# Patient Record
Sex: MALE | Race: WHITE | NOT HISPANIC OR LATINO | Employment: FULL TIME | URBAN - METROPOLITAN AREA
[De-identification: names, ages, dates, MRNs, and addresses within clinical notes are randomized per-mention and may not be internally consistent; named-entity substitution may affect disease eponyms.]

---

## 2017-03-27 ENCOUNTER — OCC HEALTH (OUTPATIENT)
Dept: OCCUPATIONAL MEDICINE | Age: 22
End: 2017-03-27

## 2017-03-27 VITALS
WEIGHT: 192 LBS | HEIGHT: 69 IN | SYSTOLIC BLOOD PRESSURE: 122 MMHG | HEART RATE: 76 BPM | BODY MASS INDEX: 28.44 KG/M2 | DIASTOLIC BLOOD PRESSURE: 64 MMHG

## 2017-03-27 DIAGNOSIS — Z02.89 VISIT FOR OCCUPATIONAL HEALTH EXAMINATION: ICD-10-CM

## 2017-03-27 DIAGNOSIS — Z11.1 SCREENING FOR TUBERCULOSIS: ICD-10-CM

## 2017-03-27 DIAGNOSIS — Z02.89 HEALTH EXAMINATION OF DEFINED SUBPOPULATION: Primary | ICD-10-CM

## 2017-03-27 DIAGNOSIS — Z02.89 ENCOUNTER FOR OCCUPATIONAL HEALTH EXAMINATION: ICD-10-CM

## 2017-03-27 PROCEDURE — 86580 TB INTRADERMAL TEST: CPT | Performed by: PHYSICIAN ASSISTANT

## 2017-03-27 PROCEDURE — OH021 PRE PLACEMENT PHYSICAL EXAM: Performed by: PHYSICIAN ASSISTANT

## 2017-03-27 PROCEDURE — OH035 DOT/N-DOT DS COLLECTION ONLY (ALL TYPES): Performed by: PHYSICIAN ASSISTANT

## 2017-03-29 ENCOUNTER — OFFICE VISIT (OUTPATIENT)
Dept: OCCUPATIONAL MEDICINE | Age: 22
End: 2017-03-29

## 2017-03-29 DIAGNOSIS — Z02.89 HEALTH EXAMINATION OF DEFINED SUBPOPULATION: Primary | ICD-10-CM

## 2017-03-29 LAB — INDURATION: NEGATIVE MM (ref 0–?)

## 2017-06-26 ENCOUNTER — WALK IN (OUTPATIENT)
Dept: URGENT CARE | Age: 22
End: 2017-06-26

## 2017-06-26 VITALS
BODY MASS INDEX: 28.61 KG/M2 | OXYGEN SATURATION: 95 % | DIASTOLIC BLOOD PRESSURE: 75 MMHG | HEART RATE: 73 BPM | SYSTOLIC BLOOD PRESSURE: 114 MMHG | RESPIRATION RATE: 16 BRPM | TEMPERATURE: 98.5 F | HEIGHT: 69 IN | WEIGHT: 193.2 LBS

## 2017-06-26 DIAGNOSIS — R11.0 NAUSEA IN ADULT: Primary | ICD-10-CM

## 2017-06-26 PROCEDURE — 99214 OFFICE O/P EST MOD 30 MIN: CPT | Performed by: INTERNAL MEDICINE

## 2017-06-26 RX ORDER — ONDANSETRON 8 MG/1
TABLET, ORALLY DISINTEGRATING ORAL
Qty: 20 TABLET | Refills: 0 | Status: SHIPPED | OUTPATIENT
Start: 2017-06-26 | End: 2017-07-06

## 2017-06-26 RX ORDER — QUETIAPINE FUMARATE 25 MG/1
25 TABLET, FILM COATED ORAL
COMMUNITY
Start: 2017-06-01 | End: 2019-12-05 | Stop reason: ALTCHOICE

## 2019-12-05 ENCOUNTER — WALK IN (OUTPATIENT)
Dept: URGENT CARE | Age: 24
End: 2019-12-05

## 2019-12-05 ENCOUNTER — IMAGING SERVICES (OUTPATIENT)
Dept: GENERAL RADIOLOGY | Age: 24
End: 2019-12-05
Attending: NURSE PRACTITIONER

## 2019-12-05 DIAGNOSIS — R05.9 COUGH: ICD-10-CM

## 2019-12-05 DIAGNOSIS — R05.9 COUGH: Primary | ICD-10-CM

## 2019-12-05 PROCEDURE — 99213 OFFICE O/P EST LOW 20 MIN: CPT | Performed by: NURSE PRACTITIONER

## 2019-12-05 PROCEDURE — 71046 X-RAY EXAM CHEST 2 VIEWS: CPT | Performed by: RADIOLOGY

## 2019-12-05 RX ORDER — ALBUTEROL SULFATE 90 UG/1
2 AEROSOL, METERED RESPIRATORY (INHALATION) EVERY 4 HOURS PRN
Qty: 1 INHALER | Refills: 0 | Status: SHIPPED | OUTPATIENT
Start: 2019-12-05 | End: 2020-06-23 | Stop reason: ALTCHOICE

## 2019-12-05 RX ORDER — PREDNISONE 20 MG/1
40 TABLET ORAL DAILY
Qty: 10 TABLET | Refills: 0 | Status: SHIPPED | OUTPATIENT
Start: 2019-12-05 | End: 2019-12-26 | Stop reason: ALTCHOICE

## 2019-12-05 ASSESSMENT — ENCOUNTER SYMPTOMS
SINUS PAIN: 0
FATIGUE: 0
WHEEZING: 0
SHORTNESS OF BREATH: 1
NAUSEA: 0
VOMITING: 0
SINUS PRESSURE: 0
COUGH: 1
FEVER: 0
DIARRHEA: 0
RHINORRHEA: 0
SORE THROAT: 0
CHILLS: 0

## 2019-12-05 ASSESSMENT — PAIN SCALES - GENERAL: PAINLEVEL: 0

## 2019-12-18 DIAGNOSIS — R05.9 COUGH: ICD-10-CM

## 2019-12-26 ENCOUNTER — HOSPITAL ENCOUNTER (EMERGENCY)
Age: 24
Discharge: HOME | End: 2019-12-27
Attending: EMERGENCY MEDICINE

## 2019-12-26 ENCOUNTER — WALK IN (OUTPATIENT)
Dept: URGENT CARE | Age: 24
End: 2019-12-26

## 2019-12-26 VITALS
RESPIRATION RATE: 14 BRPM | HEIGHT: 70 IN | BODY MASS INDEX: 25.56 KG/M2 | DIASTOLIC BLOOD PRESSURE: 84 MMHG | OXYGEN SATURATION: 99 % | SYSTOLIC BLOOD PRESSURE: 120 MMHG | HEART RATE: 92 BPM | WEIGHT: 178.57 LBS | TEMPERATURE: 99.7 F

## 2019-12-26 DIAGNOSIS — B34.9 VIRAL SYNDROME: Primary | ICD-10-CM

## 2019-12-26 DIAGNOSIS — R53.83 MALAISE AND FATIGUE: Primary | ICD-10-CM

## 2019-12-26 DIAGNOSIS — R42 LIGHTHEADEDNESS: ICD-10-CM

## 2019-12-26 DIAGNOSIS — R53.81 MALAISE AND FATIGUE: Primary | ICD-10-CM

## 2019-12-26 LAB
ALBUMIN SERPL-MCNC: 4.4 G/DL (ref 3.6–5.1)
ALBUMIN/GLOB SERPL: 1.3 {RATIO} (ref 1–2.4)
ALP SERPL-CCNC: 76 UNITS/L (ref 45–117)
ALT SERPL-CCNC: 43 UNITS/L
ANION GAP SERPL CALC-SCNC: 12 MMOL/L (ref 10–20)
AST SERPL-CCNC: 24 UNITS/L
BASOPHILS # BLD AUTO: 0.1 K/MCL (ref 0–0.3)
BASOPHILS NFR BLD AUTO: 1 %
BILIRUB SERPL-MCNC: 0.6 MG/DL (ref 0.2–1)
BUN SERPL-MCNC: 15 MG/DL (ref 6–20)
BUN/CREAT SERPL: 21 (ref 7–25)
CALCIUM SERPL-MCNC: 9.7 MG/DL (ref 8.4–10.2)
CHLORIDE SERPL-SCNC: 103 MMOL/L (ref 98–107)
CO2 SERPL-SCNC: 30 MMOL/L (ref 21–32)
CREAT SERPL-MCNC: 0.7 MG/DL (ref 0.67–1.17)
DIFFERENTIAL METHOD BLD: NORMAL
EOSINOPHIL # BLD AUTO: 0.1 K/MCL (ref 0.1–0.5)
EOSINOPHIL NFR SPEC: 1 %
ERYTHROCYTE [DISTWIDTH] IN BLOOD: 12.4 % (ref 11–15)
GLOBULIN SER-MCNC: 3.5 G/DL (ref 2–4)
GLUCOSE SERPL-MCNC: 108 MG/DL (ref 65–99)
HCT VFR BLD CALC: 47.4 % (ref 39–51)
HGB BLD-MCNC: 16.2 G/DL (ref 13–17)
IMM GRANULOCYTES # BLD AUTO: 0 K/MCL (ref 0–0.2)
IMM GRANULOCYTES NFR BLD: 0 %
LYMPHOCYTES # BLD MANUAL: 3 K/MCL (ref 1–4.8)
LYMPHOCYTES NFR BLD MANUAL: 39 %
MCH RBC QN AUTO: 30.6 PG (ref 26–34)
MCHC RBC AUTO-ENTMCNC: 34.2 G/DL (ref 32–36.5)
MCV RBC AUTO: 89.6 FL (ref 78–100)
MONOCYTES # BLD MANUAL: 0.6 K/MCL (ref 0.3–0.9)
MONOCYTES NFR BLD MANUAL: 8 %
NEUTROPHILS # BLD: 4 K/MCL (ref 1.8–7.7)
NEUTROPHILS NFR BLD AUTO: 51 %
NRBC BLD MANUAL-RTO: 0 /100 WBC
PLATELET # BLD: 237 K/MCL (ref 140–450)
POTASSIUM SERPL-SCNC: 3.8 MMOL/L (ref 3.4–5.1)
PROT SERPL-MCNC: 7.9 G/DL (ref 6.4–8.2)
RBC # BLD: 5.29 MIL/MCL (ref 4.5–5.9)
SODIUM SERPL-SCNC: 141 MMOL/L (ref 135–145)
WBC # BLD: 7.8 K/MCL (ref 4.2–11)

## 2019-12-26 PROCEDURE — 87502 INFLUENZA DNA AMP PROBE: CPT

## 2019-12-26 PROCEDURE — 96374 THER/PROPH/DIAG INJ IV PUSH: CPT

## 2019-12-26 PROCEDURE — 99284 EMERGENCY DEPT VISIT MOD MDM: CPT

## 2019-12-26 PROCEDURE — 86618 LYME DISEASE ANTIBODY: CPT

## 2019-12-26 PROCEDURE — 10002807 HB RX 258: Performed by: EMERGENCY MEDICINE

## 2019-12-26 PROCEDURE — 86308 HETEROPHILE ANTIBODY SCREEN: CPT

## 2019-12-26 PROCEDURE — 99214 OFFICE O/P EST MOD 30 MIN: CPT | Performed by: FAMILY MEDICINE

## 2019-12-26 PROCEDURE — 80053 COMPREHEN METABOLIC PANEL: CPT

## 2019-12-26 PROCEDURE — 96361 HYDRATE IV INFUSION ADD-ON: CPT

## 2019-12-26 PROCEDURE — 10002800 HB RX 250 W HCPCS: Performed by: EMERGENCY MEDICINE

## 2019-12-26 PROCEDURE — 85025 COMPLETE CBC W/AUTO DIFF WBC: CPT

## 2019-12-26 PROCEDURE — 36415 COLL VENOUS BLD VENIPUNCTURE: CPT

## 2019-12-26 RX ORDER — ONDANSETRON 2 MG/ML
4 INJECTION INTRAMUSCULAR; INTRAVENOUS
Status: COMPLETED | OUTPATIENT
Start: 2019-12-26 | End: 2019-12-26

## 2019-12-26 RX ADMIN — SODIUM CHLORIDE 1000 ML: 9 INJECTION, SOLUTION INTRAVENOUS at 23:35

## 2019-12-26 RX ADMIN — ONDANSETRON 4 MG: 2 INJECTION INTRAMUSCULAR; INTRAVENOUS at 23:35

## 2019-12-26 SDOH — HEALTH STABILITY: MENTAL HEALTH: HOW OFTEN DO YOU HAVE A DRINK CONTAINING ALCOHOL?: NEVER

## 2019-12-26 ASSESSMENT — PAIN SCALES - GENERAL
PAINLEVEL_OUTOF10: 3
PAINLEVEL_OUTOF10: 0

## 2019-12-27 VITALS
RESPIRATION RATE: 18 BRPM | HEIGHT: 70 IN | OXYGEN SATURATION: 99 % | TEMPERATURE: 99.5 F | WEIGHT: 179.45 LBS | HEART RATE: 90 BPM | DIASTOLIC BLOOD PRESSURE: 64 MMHG | BODY MASS INDEX: 25.69 KG/M2 | SYSTOLIC BLOOD PRESSURE: 132 MMHG

## 2019-12-27 LAB
FLUAV RNA SPEC QL NAA+PROBE: NOT DETECTED
FLUBV RNA SPEC QL NAA+PROBE: NOT DETECTED
HETEROPH AB SERPL QL IA: NEGATIVE
SPECIMEN SOURCE: NORMAL

## 2019-12-27 PROCEDURE — 99284 EMERGENCY DEPT VISIT MOD MDM: CPT | Performed by: EMERGENCY MEDICINE

## 2019-12-27 RX ORDER — ONDANSETRON 4 MG/1
4 TABLET, ORALLY DISINTEGRATING ORAL EVERY 8 HOURS PRN
Qty: 15 TABLET | Refills: 0 | Status: SHIPPED | OUTPATIENT
Start: 2019-12-27 | End: 2020-06-23 | Stop reason: CLARIF

## 2019-12-27 ASSESSMENT — ENCOUNTER SYMPTOMS
DIARRHEA: 0
VOMITING: 0
ABDOMINAL PAIN: 0
COUGH: 0
SHORTNESS OF BREATH: 0
DIZZINESS: 1
HEADACHES: 1
SORE THROAT: 0
FEVER: 1
NAUSEA: 1

## 2019-12-29 ENCOUNTER — NURSE TRIAGE (OUTPATIENT)
Dept: TELEHEALTH | Age: 24
End: 2019-12-29

## 2019-12-29 LAB — B BURGDOR IGG+IGM SER QL: NEGATIVE

## 2020-01-01 ENCOUNTER — EXTERNAL RECORD (OUTPATIENT)
Dept: OTHER | Age: 25
End: 2020-01-01

## 2020-01-14 ENCOUNTER — WALK IN (OUTPATIENT)
Dept: URGENT CARE | Age: 25
End: 2020-01-14

## 2020-01-14 DIAGNOSIS — B00.9 HERPES SIMPLEX: Primary | ICD-10-CM

## 2020-01-14 PROCEDURE — 99213 OFFICE O/P EST LOW 20 MIN: CPT | Performed by: PHYSICIAN ASSISTANT

## 2020-01-14 RX ORDER — VALACYCLOVIR HYDROCHLORIDE 500 MG/1
500 TABLET, FILM COATED ORAL 2 TIMES DAILY
Qty: 6 TABLET | Refills: 0 | Status: SHIPPED | OUTPATIENT
Start: 2020-01-14 | End: 2020-01-17

## 2020-01-14 ASSESSMENT — PAIN SCALES - GENERAL: PAINLEVEL: 0

## 2020-04-10 RX ORDER — ALBUTEROL SULFATE 90 UG/1
AEROSOL, METERED RESPIRATORY (INHALATION)
Qty: 18 G | Refills: 0 | OUTPATIENT
Start: 2020-04-10

## 2020-05-10 ENCOUNTER — HOSPITAL ENCOUNTER (EMERGENCY)
Age: 25
Discharge: HOME | End: 2020-05-10
Attending: EMERGENCY MEDICINE

## 2020-05-10 VITALS
OXYGEN SATURATION: 99 % | DIASTOLIC BLOOD PRESSURE: 60 MMHG | HEART RATE: 71 BPM | HEIGHT: 69 IN | WEIGHT: 170 LBS | BODY MASS INDEX: 25.18 KG/M2 | SYSTOLIC BLOOD PRESSURE: 121 MMHG | RESPIRATION RATE: 18 BRPM | TEMPERATURE: 98.6 F

## 2020-05-10 DIAGNOSIS — G56.02 CARPAL TUNNEL SYNDROME OF LEFT WRIST: Primary | ICD-10-CM

## 2020-05-10 PROCEDURE — 10002803 HB RX 637: Performed by: EMERGENCY MEDICINE

## 2020-05-10 PROCEDURE — L3908 WHO COCK-UP NONMOLDE PRE OTS: HCPCS | Performed by: EMERGENCY MEDICINE

## 2020-05-10 PROCEDURE — 99283 EMERGENCY DEPT VISIT LOW MDM: CPT

## 2020-05-10 PROCEDURE — 99284 EMERGENCY DEPT VISIT MOD MDM: CPT | Performed by: EMERGENCY MEDICINE

## 2020-05-10 RX ORDER — IBUPROFEN 800 MG/1
800 TABLET ORAL ONCE
Status: COMPLETED | OUTPATIENT
Start: 2020-05-10 | End: 2020-05-10

## 2020-05-10 RX ADMIN — IBUPROFEN 800 MG: 800 TABLET ORAL at 00:42

## 2020-05-11 ENCOUNTER — WALK IN (OUTPATIENT)
Dept: URGENT CARE | Age: 25
End: 2020-05-11

## 2020-05-11 DIAGNOSIS — J06.9 VIRAL URI: ICD-10-CM

## 2020-05-11 DIAGNOSIS — R59.0 CERVICAL ADENOPATHY: Primary | ICD-10-CM

## 2020-05-11 PROCEDURE — 99213 OFFICE O/P EST LOW 20 MIN: CPT | Performed by: FAMILY MEDICINE

## 2020-05-11 ASSESSMENT — PAIN SCALES - GENERAL: PAINLEVEL: 0

## 2020-05-14 ENCOUNTER — TELEPHONE (OUTPATIENT)
Dept: ORTHOPEDICS | Age: 25
End: 2020-05-14

## 2020-06-23 ENCOUNTER — OFFICE VISIT (OUTPATIENT)
Dept: INFECTIOUS DISEASES | Age: 25
End: 2020-06-23

## 2020-06-23 ENCOUNTER — LAB SERVICES (OUTPATIENT)
Dept: LAB | Age: 25
End: 2020-06-23

## 2020-06-23 VITALS
RESPIRATION RATE: 17 BRPM | HEIGHT: 69 IN | WEIGHT: 150.9 LBS | HEART RATE: 86 BPM | OXYGEN SATURATION: 100 % | SYSTOLIC BLOOD PRESSURE: 117 MMHG | DIASTOLIC BLOOD PRESSURE: 77 MMHG | BODY MASS INDEX: 22.35 KG/M2 | TEMPERATURE: 98 F

## 2020-06-23 DIAGNOSIS — R63.4 WEIGHT LOSS: ICD-10-CM

## 2020-06-23 DIAGNOSIS — R63.4 WEIGHT LOSS: Primary | ICD-10-CM

## 2020-06-23 PROCEDURE — 86708 HEPATITIS A ANTIBODY: CPT | Performed by: INTERNAL MEDICINE

## 2020-06-23 PROCEDURE — 86706 HEP B SURFACE ANTIBODY: CPT | Performed by: INTERNAL MEDICINE

## 2020-06-23 PROCEDURE — 86361 T CELL ABSOLUTE COUNT: CPT | Performed by: INTERNAL MEDICINE

## 2020-06-23 PROCEDURE — 99244 OFF/OP CNSLTJ NEW/EST MOD 40: CPT | Performed by: INTERNAL MEDICINE

## 2020-06-23 PROCEDURE — 82784 ASSAY IGA/IGD/IGG/IGM EACH: CPT | Performed by: INTERNAL MEDICINE

## 2020-06-23 PROCEDURE — 86592 SYPHILIS TEST NON-TREP QUAL: CPT | Performed by: INTERNAL MEDICINE

## 2020-06-23 PROCEDURE — 36415 COLL VENOUS BLD VENIPUNCTURE: CPT | Performed by: INTERNAL MEDICINE

## 2020-06-23 PROCEDURE — 86704 HEP B CORE ANTIBODY TOTAL: CPT | Performed by: INTERNAL MEDICINE

## 2020-06-23 PROCEDURE — 86709 HEPATITIS A IGM ANTIBODY: CPT | Performed by: INTERNAL MEDICINE

## 2020-06-23 PROCEDURE — 86777 TOXOPLASMA ANTIBODY: CPT | Performed by: INTERNAL MEDICINE

## 2020-06-23 PROCEDURE — 86803 HEPATITIS C AB TEST: CPT | Performed by: INTERNAL MEDICINE

## 2020-06-23 SDOH — HEALTH STABILITY: MENTAL HEALTH: HOW OFTEN DO YOU HAVE A DRINK CONTAINING ALCOHOL?: NEVER

## 2020-06-24 LAB
CD3+CD4+ CELLS # BLD: 1038 /MCL (ref 400–1400)
CD3+CD4+ CELLS NFR BLD: 39 % OF LYMP (ref 32–59)
HAV IGG+IGM SER QL: POSITIVE
HAV IGM SER QL: NEGATIVE
HBV CORE IGG+IGM SER QL: NEGATIVE
HBV SURFACE AB SER QL: POSITIVE
HCV AB SER QL: NEGATIVE
IGA SERPL-MCNC: 276 MG/DL (ref 82–453)
IGG SERPL-MCNC: 1010 MG/DL (ref 751–1560)
IGM SERPL-MCNC: 75 MG/DL (ref 46–304)
RPR SER QL: NONREACTIVE
T GONDII IGG SER-ACNC: <0.5 UNITS/ML

## 2020-07-02 ENCOUNTER — CLINICAL ABSTRACT (OUTPATIENT)
Dept: OTHER | Age: 25
End: 2020-07-02

## 2020-08-12 ENCOUNTER — WALK IN (OUTPATIENT)
Dept: URGENT CARE | Age: 25
End: 2020-08-12

## 2020-08-12 DIAGNOSIS — R63.4 WEIGHT LOSS: Primary | ICD-10-CM

## 2020-08-12 DIAGNOSIS — R35.0 URINARY FREQUENCY: ICD-10-CM

## 2020-08-12 LAB
APPEARANCE, POC: CLEAR
BILIRUBIN, POC: NEGATIVE
COLOR, POC: NORMAL
GLUCOSE BLDC GLUCOMTR-MCNC: 126 MG/DL
GLUCOSE UR-MCNC: NEGATIVE MG/DL
KETONES, POC: NEGATIVE
LENGTH OF FAST TIME PATIENT: NORMAL H
NITRITE, POC: NEGATIVE
OCCULT BLOOD, POC: NEGATIVE
PH UR: 7.5 [PH] (ref 5–7)
PROT UR-MCNC: NEGATIVE G/DL
SP GR UR: 1.02 (ref 1–1.03)
UROBILINOGEN UR-MCNC: 1 MG/DL (ref 0–1)
WBC (LEUKOCYTE) ESTERASE, POC: NEGATIVE

## 2020-08-12 PROCEDURE — 99214 OFFICE O/P EST MOD 30 MIN: CPT | Performed by: FAMILY MEDICINE

## 2020-08-12 PROCEDURE — 81002 URINALYSIS NONAUTO W/O SCOPE: CPT | Performed by: FAMILY MEDICINE

## 2020-08-12 PROCEDURE — 80050 GENERAL HEALTH PANEL: CPT | Performed by: INTERNAL MEDICINE

## 2020-08-12 PROCEDURE — 87389 HIV-1 AG W/HIV-1&-2 AB AG IA: CPT | Performed by: INTERNAL MEDICINE

## 2020-08-12 SDOH — HEALTH STABILITY: MENTAL HEALTH: HOW OFTEN DO YOU HAVE A DRINK CONTAINING ALCOHOL?: NEVER

## 2020-08-12 ASSESSMENT — PAIN SCALES - GENERAL: PAINLEVEL: 0

## 2020-08-13 ENCOUNTER — TELEPHONE (OUTPATIENT)
Dept: URGENT CARE | Age: 25
End: 2020-08-13

## 2020-08-13 LAB
ALBUMIN SERPL-MCNC: 4.7 G/DL (ref 3.6–5.1)
ALBUMIN/GLOB SERPL: 1.6 {RATIO} (ref 1–2.4)
ALP SERPL-CCNC: 91 UNITS/L (ref 45–117)
ALT SERPL-CCNC: 27 UNITS/L
ANION GAP SERPL CALC-SCNC: 12 MMOL/L (ref 10–20)
AST SERPL-CCNC: 14 UNITS/L
BASOPHILS # BLD: 0 K/MCL (ref 0–0.3)
BASOPHILS NFR BLD: 1 %
BILIRUB SERPL-MCNC: 0.7 MG/DL (ref 0.2–1)
BUN SERPL-MCNC: 15 MG/DL (ref 6–20)
BUN/CREAT SERPL: 19 (ref 7–25)
CALCIUM SERPL-MCNC: 9.8 MG/DL (ref 8.4–10.2)
CHLORIDE SERPL-SCNC: 106 MMOL/L (ref 98–107)
CO2 SERPL-SCNC: 26 MMOL/L (ref 21–32)
CREAT SERPL-MCNC: 0.77 MG/DL (ref 0.67–1.17)
DEPRECATED RDW RBC: 42.8 FL (ref 39–50)
EOSINOPHIL # BLD: 0.1 K/MCL (ref 0.1–0.5)
EOSINOPHIL NFR BLD: 1 %
ERYTHROCYTE [DISTWIDTH] IN BLOOD: 12.6 % (ref 11–15)
FASTING DURATION TIME PATIENT: ABNORMAL H
GFR SERPLBLD BASED ON 1.73 SQ M-ARVRAT: >90 ML/MIN/1.73M2
GLOBULIN SER-MCNC: 3 G/DL (ref 2–4)
GLUCOSE SERPL-MCNC: 107 MG/DL (ref 65–99)
HCT VFR BLD CALC: 47.7 % (ref 39–51)
HGB BLD-MCNC: 16.1 G/DL (ref 13–17)
HIV 1+2 AB+HIV1 P24 AG SERPL QL IA: NONREACTIVE
IMM GRANULOCYTES # BLD AUTO: 0 K/MCL (ref 0–0.2)
IMM GRANULOCYTES # BLD: 0 %
LYMPHOCYTES # BLD: 1.9 K/MCL (ref 1–4.8)
LYMPHOCYTES NFR BLD: 34 %
MCH RBC QN AUTO: 31.3 PG (ref 26–34)
MCHC RBC AUTO-ENTMCNC: 33.8 G/DL (ref 32–36.5)
MCV RBC AUTO: 92.6 FL (ref 78–100)
MONOCYTES # BLD: 0.5 K/MCL (ref 0.3–0.9)
MONOCYTES NFR BLD: 8 %
NEUTROPHILS # BLD: 3.2 K/MCL (ref 1.8–7.7)
NEUTROPHILS NFR BLD: 56 %
NRBC BLD MANUAL-RTO: 0 /100 WBC
PLATELET # BLD AUTO: 216 K/MCL (ref 140–450)
POTASSIUM SERPL-SCNC: 4.3 MMOL/L (ref 3.4–5.1)
PROT SERPL-MCNC: 7.7 G/DL (ref 6.4–8.2)
RBC # BLD: 5.15 MIL/MCL (ref 4.5–5.9)
SODIUM SERPL-SCNC: 140 MMOL/L (ref 135–145)
TSH SERPL-ACNC: 0.48 MCUNITS/ML (ref 0.35–5)
WBC # BLD: 5.6 K/MCL (ref 4.2–11)

## 2020-10-25 ENCOUNTER — WALK IN (OUTPATIENT)
Dept: URGENT CARE | Age: 25
End: 2020-10-25

## 2020-10-25 DIAGNOSIS — H00.011 HORDEOLUM EXTERNUM OF RIGHT UPPER EYELID: ICD-10-CM

## 2020-10-25 DIAGNOSIS — H01.9 INFECTION OF EYELID: Primary | ICD-10-CM

## 2020-10-25 PROCEDURE — 99213 OFFICE O/P EST LOW 20 MIN: CPT | Performed by: FAMILY MEDICINE

## 2020-10-25 RX ORDER — CEPHALEXIN 500 MG/1
500 CAPSULE ORAL 4 TIMES DAILY
Qty: 28 CAPSULE | Refills: 0 | Status: SHIPPED | OUTPATIENT
Start: 2020-10-25 | End: 2020-12-15 | Stop reason: ALTCHOICE

## 2020-10-25 RX ORDER — POLYMYXIN B SULFATE AND TRIMETHOPRIM 1; 10000 MG/ML; [USP'U]/ML
1 SOLUTION OPHTHALMIC EVERY 6 HOURS
Qty: 10 ML | Refills: 0 | Status: SHIPPED | OUTPATIENT
Start: 2020-10-25 | End: 2020-11-01

## 2020-10-25 ASSESSMENT — PAIN SCALES - GENERAL: PAINLEVEL: 3-4

## 2020-12-14 LAB
ALBUMIN SERPL-MCNC: 5.1 GM/DL (ref 3.5–5.2)
ALP SERPL-CCNC: 92 UNIT/L (ref 40–150)
ALT SERPL-CCNC: 25 UNIT/L (ref 8–63)
ANION GAP SERPL CALC-SCNC: 15 MMOL/L (ref 6–16)
AST SERPL-CCNC: 19 UNIT/L (ref 5–34)
BILIRUB SERPL-MCNC: 0.7 MG/DL (ref 0.2–1.2)
BUN SERPL-MCNC: 15 MG/DL (ref 7–26)
CALCIUM SERPL-MCNC: 10.1 MG/DL (ref 8.4–10.4)
CHLORIDE SERPL-SCNC: 105 MMOL/L (ref 100–110)
CO2 SERPL-SCNC: 22 MMOL/L (ref 22–29)
CREAT SERPL-MCNC: 0.79 MG/DL (ref 0.72–1.25)
GLUCOSE SERPL-MCNC: 88 MG/DL (ref 70–99)
POTASSIUM SERPL-SCNC: 3.8 MMOL/L (ref 3.5–5.1)
PROT SERPL-MCNC: 8.4 GM/DL (ref 6.2–8.3)
SODIUM SERPL-SCNC: 142 MMOL/L (ref 136–145)

## 2020-12-15 ENCOUNTER — TELEPHONE (OUTPATIENT)
Dept: ELECTROPHYSIOLOGY | Age: 25
End: 2020-12-15

## 2020-12-15 ENCOUNTER — OFFICE VISIT (OUTPATIENT)
Dept: CARDIOLOGY | Age: 25
End: 2020-12-15

## 2020-12-15 VITALS
BODY MASS INDEX: 22.99 KG/M2 | WEIGHT: 155.2 LBS | SYSTOLIC BLOOD PRESSURE: 104 MMHG | DIASTOLIC BLOOD PRESSURE: 68 MMHG | HEIGHT: 69 IN | HEART RATE: 72 BPM

## 2020-12-15 DIAGNOSIS — R55 SYNCOPE, UNSPECIFIED SYNCOPE TYPE: Primary | ICD-10-CM

## 2020-12-15 DIAGNOSIS — R00.2 PALPITATIONS: ICD-10-CM

## 2020-12-15 PROCEDURE — 99243 OFF/OP CNSLTJ NEW/EST LOW 30: CPT | Performed by: INTERNAL MEDICINE

## 2020-12-18 ENCOUNTER — IMAGING SERVICES (OUTPATIENT)
Dept: CV DIAGNOSTICS | Age: 25
End: 2020-12-18
Attending: INTERNAL MEDICINE

## 2020-12-18 DIAGNOSIS — R55 SYNCOPE, UNSPECIFIED SYNCOPE TYPE: ICD-10-CM

## 2020-12-18 DIAGNOSIS — R00.2 PALPITATIONS: ICD-10-CM

## 2020-12-18 LAB
AORTIC VALVE AREA: 2.96 CMÂ²
ASCENDING AORTA (AAD): 2.83 CM
AV MEAN GRADIENT (AVMG): 2.82 MMHG
AV PEAK GRADIENT (AVPG): 5.8 MMHG
AV PEAK VELOCITY (AVPV): 1.2 M/S
AVI LVOT PEAK GRADIENT (LVOTMG): 1.97 MMHG
DOP CALC LVOT PEAK VEL (LVOTPV): 1.04 M/S
E WAVE DECELARATION TIME (MDT): 0.17 S
EST RIGHT VENT SYSTOLIC PRESSURE BY TRICUSPID REGURGITATION JET (RVSP): 33.91 MMHG
INTERVENTRICULAR SEPTUM IN END DIASTOLE (IVSD): 1.14 CM
LEFT INTERNAL DIMENSION IN SYSTOLE (LVSD): 3.09 CM
LEFT VENTRICULAR INTERNAL DIMENSION IN DIASTOLE (LVDD): 4.67 CM
LEFT VENTRICULAR POSTERIOR WALL IN END DIASTOLE (LVPW): 0.99 CM
LV EF: 60 %
LVOT 2D (LVOTD): 2.06 CM
LVOT VTI (LVOTVTI): 20.53 CM
MV E TISSUE VEL LAT (MELV): 0.22 M/S
MV E TISSUE VEL MED (MESV): 0.13 M/S
MV E WAVE VEL/E TISSUE VEL MED(MSR): 5.61 UNITLESS
MV PEAK A VELOCITY (MVPAV): 0.39 M/S
MV PEAK E VELOCITY (MVPEV): 0.7 M/S
TRICUSPID VALVE ANNULAR PEAK VELOCITY (TVAPV): 0.15 M/S
TRICUSPID VALVE PEAK REGURGITATION VELOCITY (TRPV): 2.55 M/S
TV ESTIMATED RIGHT ARTERIAL PRESSURE (RAP): 8 MMHG

## 2020-12-18 PROCEDURE — 76376 3D RENDER W/INTRP POSTPROCES: CPT | Performed by: INTERNAL MEDICINE

## 2020-12-18 PROCEDURE — 93306 TTE W/DOPPLER COMPLETE: CPT | Performed by: INTERNAL MEDICINE

## 2020-12-18 PROCEDURE — 93356 MYOCRD STRAIN IMG SPCKL TRCK: CPT | Performed by: INTERNAL MEDICINE

## 2021-01-01 ENCOUNTER — EXTERNAL RECORD (OUTPATIENT)
Dept: OTHER | Age: 26
End: 2021-01-01

## 2021-01-08 ENCOUNTER — CLINICAL ABSTRACT (OUTPATIENT)
Dept: HEALTH INFORMATION MANAGEMENT | Age: 26
End: 2021-01-08

## 2021-01-25 ENCOUNTER — HOSPITAL ENCOUNTER (EMERGENCY)
Age: 26
Discharge: HOME | End: 2021-01-25
Attending: EMERGENCY MEDICINE

## 2021-01-25 ENCOUNTER — WALK IN (OUTPATIENT)
Dept: URGENT CARE | Age: 26
End: 2021-01-25

## 2021-01-25 VITALS
TEMPERATURE: 98.9 F | HEART RATE: 75 BPM | RESPIRATION RATE: 16 BRPM | SYSTOLIC BLOOD PRESSURE: 118 MMHG | OXYGEN SATURATION: 99 % | DIASTOLIC BLOOD PRESSURE: 67 MMHG

## 2021-01-25 VITALS
HEART RATE: 77 BPM | OXYGEN SATURATION: 99 % | BODY MASS INDEX: 24.66 KG/M2 | DIASTOLIC BLOOD PRESSURE: 60 MMHG | TEMPERATURE: 97.8 F | SYSTOLIC BLOOD PRESSURE: 120 MMHG | RESPIRATION RATE: 18 BRPM | WEIGHT: 167 LBS

## 2021-01-25 DIAGNOSIS — H02.402 PTOSIS OF LEFT EYELID: Primary | ICD-10-CM

## 2021-01-25 DIAGNOSIS — R29.818 TRANSIENT NEUROLOGICAL SYMPTOMS: Primary | ICD-10-CM

## 2021-01-25 PROCEDURE — 99283 EMERGENCY DEPT VISIT LOW MDM: CPT

## 2021-01-25 PROCEDURE — 99213 OFFICE O/P EST LOW 20 MIN: CPT | Performed by: PHYSICIAN ASSISTANT

## 2021-01-25 PROCEDURE — 93005 ELECTROCARDIOGRAM TRACING: CPT | Performed by: EMERGENCY MEDICINE

## 2021-01-25 ASSESSMENT — ENCOUNTER SYMPTOMS
WEAKNESS: 0
HEADACHES: 0
FACIAL ASYMMETRY: 1
CONSTIPATION: 0
NAUSEA: 0
FATIGUE: 1
SHORTNESS OF BREATH: 0
FEVER: 0
DIZZINESS: 0
VOMITING: 0
BRUISES/BLEEDS EASILY: 0
CHILLS: 0
DIARRHEA: 0
SINUS PRESSURE: 0
ABDOMINAL PAIN: 0
NUMBNESS: 1
RHINORRHEA: 0
NERVOUS/ANXIOUS: 0
CONFUSION: 0
DIAPHORESIS: 0

## 2021-01-25 ASSESSMENT — PAIN SCALES - GENERAL
PAINLEVEL_OUTOF10: 0
PAINLEVEL_OUTOF10: 0

## 2021-01-26 LAB
ATRIAL RATE (BPM): 79
DIASTOLIC BLOOD PRESSURE: 58
P AXIS (DEGREES): 66
PR-INTERVAL (MSEC): 136
QRS-INTERVAL (MSEC): 88
QT-INTERVAL (MSEC): 362
QTC: 415
R AXIS (DEGREES): 78
REPORT TEXT: NORMAL
SYSTOLIC BLOOD PRESSURE: 131
T AXIS (DEGREES): 35
VENTRICULAR RATE EKG/MIN (BPM): 79

## 2021-04-07 VITALS
BODY MASS INDEX: 25.1 KG/M2 | HEART RATE: 77 BPM | SYSTOLIC BLOOD PRESSURE: 104 MMHG | SYSTOLIC BLOOD PRESSURE: 120 MMHG | TEMPERATURE: 98.4 F | HEIGHT: 70 IN | OXYGEN SATURATION: 99 % | HEART RATE: 71 BPM | TEMPERATURE: 98 F | HEIGHT: 70 IN | DIASTOLIC BLOOD PRESSURE: 74 MMHG | BODY MASS INDEX: 24.34 KG/M2 | DIASTOLIC BLOOD PRESSURE: 67 MMHG | DIASTOLIC BLOOD PRESSURE: 70 MMHG | HEIGHT: 69 IN | SYSTOLIC BLOOD PRESSURE: 122 MMHG | BODY MASS INDEX: 25.75 KG/M2 | OXYGEN SATURATION: 98 % | OXYGEN SATURATION: 99 % | SYSTOLIC BLOOD PRESSURE: 120 MMHG | HEIGHT: 70 IN | OXYGEN SATURATION: 99 % | HEART RATE: 98 BPM | WEIGHT: 170 LBS | DIASTOLIC BLOOD PRESSURE: 72 MMHG | HEART RATE: 72 BPM | WEIGHT: 175 LBS | SYSTOLIC BLOOD PRESSURE: 97 MMHG | BODY MASS INDEX: 25.05 KG/M2 | WEIGHT: 170 LBS | RESPIRATION RATE: 18 BRPM | RESPIRATION RATE: 14 BRPM | WEIGHT: 179.9 LBS | OXYGEN SATURATION: 99 % | TEMPERATURE: 98.6 F | RESPIRATION RATE: 20 BRPM | WEIGHT: 145 LBS | RESPIRATION RATE: 16 BRPM | TEMPERATURE: 98.4 F | RESPIRATION RATE: 18 BRPM | TEMPERATURE: 98.1 F | DIASTOLIC BLOOD PRESSURE: 80 MMHG | HEART RATE: 82 BPM | BODY MASS INDEX: 21.48 KG/M2

## 2021-11-30 ENCOUNTER — WALK IN (OUTPATIENT)
Dept: URGENT CARE | Age: 26
End: 2021-11-30

## 2021-11-30 VITALS
HEART RATE: 84 BPM | OXYGEN SATURATION: 99 % | RESPIRATION RATE: 18 BRPM | DIASTOLIC BLOOD PRESSURE: 72 MMHG | SYSTOLIC BLOOD PRESSURE: 128 MMHG | TEMPERATURE: 98.5 F

## 2021-11-30 DIAGNOSIS — Z20.822 PERSON UNDER INVESTIGATION FOR COVID-19: Primary | ICD-10-CM

## 2021-11-30 DIAGNOSIS — R21 RASH: ICD-10-CM

## 2021-11-30 LAB — SARS-COV+SARS-COV-2 AG RESP QL IA.RAPID: NOT DETECTED

## 2021-11-30 PROCEDURE — 99213 OFFICE O/P EST LOW 20 MIN: CPT | Performed by: PHYSICIAN ASSISTANT

## 2021-11-30 PROCEDURE — 87426 SARSCOV CORONAVIRUS AG IA: CPT | Performed by: PHYSICIAN ASSISTANT

## 2021-11-30 RX ORDER — VALACYCLOVIR HYDROCHLORIDE 500 MG/1
500 TABLET, FILM COATED ORAL 2 TIMES DAILY
Qty: 6 TABLET | Refills: 0 | Status: SHIPPED | OUTPATIENT
Start: 2021-11-30 | End: 2021-12-03

## 2021-11-30 RX ORDER — DIAPER,BRIEF,INFANT-TODD,DISP
EACH MISCELLANEOUS 2 TIMES DAILY
Qty: 30 G | Refills: 0 | Status: SHIPPED | OUTPATIENT
Start: 2021-11-30

## 2021-11-30 ASSESSMENT — ENCOUNTER SYMPTOMS
CONSTITUTIONAL NEGATIVE: 1
NAUSEA: 0
CONSTIPATION: 0
DIARRHEA: 1
VOMITING: 0
RESPIRATORY NEGATIVE: 1
ABDOMINAL DISTENTION: 0
ABDOMINAL PAIN: 0

## 2023-05-16 ENCOUNTER — APPOINTMENT (RX ONLY)
Dept: URBAN - METROPOLITAN AREA CLINIC 12 | Facility: CLINIC | Age: 28
Setting detail: DERMATOLOGY
End: 2023-05-16

## 2023-05-16 DIAGNOSIS — Z71.89 OTHER SPECIFIED COUNSELING: ICD-10-CM

## 2023-05-16 PROCEDURE — ? COUNSELING

## 2024-02-20 ENCOUNTER — APPOINTMENT (RX ONLY)
Dept: URBAN - METROPOLITAN AREA CLINIC 303 | Facility: CLINIC | Age: 29
Setting detail: DERMATOLOGY
End: 2024-02-20

## 2024-02-20 DIAGNOSIS — D22 MELANOCYTIC NEVI: ICD-10-CM

## 2024-02-20 DIAGNOSIS — L81.4 OTHER MELANIN HYPERPIGMENTATION: ICD-10-CM

## 2024-02-20 DIAGNOSIS — D18.0 HEMANGIOMA: ICD-10-CM

## 2024-02-20 PROBLEM — D22.5 MELANOCYTIC NEVI OF TRUNK: Status: ACTIVE | Noted: 2024-02-20

## 2024-02-20 PROBLEM — D18.01 HEMANGIOMA OF SKIN AND SUBCUTANEOUS TISSUE: Status: ACTIVE | Noted: 2024-02-20

## 2024-02-20 PROCEDURE — ? COUNSELING

## 2024-02-20 PROCEDURE — ? SUNSCREEN RECOMMENDATIONS

## 2024-02-20 PROCEDURE — ? TREATMENT REGIMEN

## 2024-02-20 PROCEDURE — ? FULL BODY SKIN EXAM

## 2024-02-20 PROCEDURE — 99203 OFFICE O/P NEW LOW 30 MIN: CPT

## 2024-02-20 ASSESSMENT — LOCATION DETAILED DESCRIPTION DERM
LOCATION DETAILED: INFERIOR THORACIC SPINE
LOCATION DETAILED: RIGHT POSTERIOR SHOULDER
LOCATION DETAILED: LEFT MEDIAL UPPER BACK

## 2024-02-20 ASSESSMENT — LOCATION ZONE DERM
LOCATION ZONE: ARM
LOCATION ZONE: TRUNK

## 2024-02-20 ASSESSMENT — LOCATION SIMPLE DESCRIPTION DERM
LOCATION SIMPLE: UPPER BACK
LOCATION SIMPLE: LEFT UPPER BACK
LOCATION SIMPLE: RIGHT SHOULDER

## 2024-02-20 NOTE — HPI: EVALUATION OF SKIN LESION(S)
What Type Of Note Output Would You Prefer (Optional)?: Bullet Format
Hpi Title: Evaluation of Skin Lesions
Additional History: FBE, no concerns \\nHx tanning beds in high school\\n\\nSpf30, 49 if out longer, reapplies, not good about hats or sunglasses

## 2025-04-03 ENCOUNTER — TELEPHONE (OUTPATIENT)
Dept: FAMILY MEDICINE | Age: 30
End: 2025-04-03

## 2025-04-08 ENCOUNTER — TELEPHONE (OUTPATIENT)
Dept: FAMILY MEDICINE | Age: 30
End: 2025-04-08

## 2025-04-09 ENCOUNTER — TELEPHONE (OUTPATIENT)
Dept: FAMILY MEDICINE | Age: 30
End: 2025-04-09

## 2025-04-10 ENCOUNTER — TELEPHONE (OUTPATIENT)
Dept: FAMILY MEDICINE | Age: 30
End: 2025-04-10

## 2025-04-10 ENCOUNTER — OFFICE VISIT (OUTPATIENT)
Dept: FAMILY MEDICINE | Age: 30
End: 2025-04-10

## 2025-04-10 VITALS
BODY MASS INDEX: 25.18 KG/M2 | OXYGEN SATURATION: 99 % | HEIGHT: 69 IN | TEMPERATURE: 97.8 F | DIASTOLIC BLOOD PRESSURE: 78 MMHG | SYSTOLIC BLOOD PRESSURE: 116 MMHG | HEART RATE: 80 BPM | WEIGHT: 170 LBS | RESPIRATION RATE: 16 BRPM

## 2025-04-10 DIAGNOSIS — Z76.89 ENCOUNTER TO ESTABLISH CARE: ICD-10-CM

## 2025-04-10 DIAGNOSIS — K76.9 LIVER LESION: Primary | ICD-10-CM

## 2025-04-10 PROCEDURE — 99203 OFFICE O/P NEW LOW 30 MIN: CPT | Performed by: FAMILY MEDICINE

## 2025-04-10 ASSESSMENT — PATIENT HEALTH QUESTIONNAIRE - PHQ9
SUM OF ALL RESPONSES TO PHQ9 QUESTIONS 1 AND 2: 0
2. FEELING DOWN, DEPRESSED OR HOPELESS: NOT AT ALL
SUM OF ALL RESPONSES TO PHQ9 QUESTIONS 1 AND 2: 0
CLINICAL INTERPRETATION OF PHQ2 SCORE: NO FURTHER SCREENING NEEDED
1. LITTLE INTEREST OR PLEASURE IN DOING THINGS: NOT AT ALL

## 2025-04-14 ENCOUNTER — TELEPHONE (OUTPATIENT)
Dept: FAMILY MEDICINE | Age: 30
End: 2025-04-14

## 2025-04-14 DIAGNOSIS — K76.9 LIVER LESION: Primary | ICD-10-CM

## 2025-04-16 ENCOUNTER — TELEPHONE (OUTPATIENT)
Dept: FAMILY MEDICINE | Age: 30
End: 2025-04-16

## 2025-04-17 ENCOUNTER — TELEPHONE (OUTPATIENT)
Dept: TRANSPLANT | Age: 30
End: 2025-04-17

## 2025-04-17 ENCOUNTER — TELEPHONE (OUTPATIENT)
Dept: FAMILY MEDICINE | Age: 30
End: 2025-04-17

## 2025-04-18 ENCOUNTER — TELEPHONE (OUTPATIENT)
Dept: FAMILY MEDICINE | Age: 30
End: 2025-04-18

## 2025-04-25 ENCOUNTER — WALK IN (OUTPATIENT)
Dept: URGENT CARE | Age: 30
End: 2025-04-25

## 2025-04-25 VITALS
TEMPERATURE: 97.6 F | RESPIRATION RATE: 16 BRPM | HEART RATE: 72 BPM | SYSTOLIC BLOOD PRESSURE: 117 MMHG | DIASTOLIC BLOOD PRESSURE: 56 MMHG | WEIGHT: 165 LBS | BODY MASS INDEX: 24.37 KG/M2 | OXYGEN SATURATION: 99 %

## 2025-04-25 DIAGNOSIS — H61.23 BILATERAL IMPACTED CERUMEN: Primary | ICD-10-CM

## 2025-05-19 ENCOUNTER — TELEPHONE (OUTPATIENT)
Dept: TRANSPLANT | Age: 30
End: 2025-05-19

## 2025-05-19 ENCOUNTER — APPOINTMENT (OUTPATIENT)
Dept: DERMATOLOGY | Age: 30
End: 2025-05-19

## 2025-05-21 ENCOUNTER — TELEPHONE (OUTPATIENT)
Dept: TRANSPLANT | Age: 30
End: 2025-05-21

## 2025-05-22 ENCOUNTER — WALK IN (OUTPATIENT)
Dept: URGENT CARE | Age: 30
End: 2025-05-22

## 2025-05-22 VITALS
DIASTOLIC BLOOD PRESSURE: 60 MMHG | WEIGHT: 170.8 LBS | SYSTOLIC BLOOD PRESSURE: 133 MMHG | HEART RATE: 82 BPM | OXYGEN SATURATION: 97 % | RESPIRATION RATE: 18 BRPM | TEMPERATURE: 98 F | BODY MASS INDEX: 25.22 KG/M2

## 2025-05-22 DIAGNOSIS — R21 RASH: Primary | ICD-10-CM

## 2025-05-22 PROCEDURE — 99214 OFFICE O/P EST MOD 30 MIN: CPT | Performed by: PHYSICIAN ASSISTANT

## 2025-05-22 RX ORDER — VALACYCLOVIR HYDROCHLORIDE 500 MG/1
500 TABLET, FILM COATED ORAL 2 TIMES DAILY
Qty: 6 TABLET | Refills: 0 | Status: SHIPPED | OUTPATIENT
Start: 2025-05-22 | End: 2025-05-25

## 2025-05-27 ENCOUNTER — OFFICE VISIT (OUTPATIENT)
Dept: TRANSPLANT | Age: 30
End: 2025-05-27
Attending: TRANSPLANT SURGERY

## 2025-05-27 VITALS
DIASTOLIC BLOOD PRESSURE: 69 MMHG | OXYGEN SATURATION: 98 % | WEIGHT: 174 LBS | TEMPERATURE: 97.1 F | HEART RATE: 80 BPM | SYSTOLIC BLOOD PRESSURE: 128 MMHG | HEIGHT: 70 IN | BODY MASS INDEX: 24.91 KG/M2

## 2025-05-27 DIAGNOSIS — K76.9 LIVER LESION: Primary | ICD-10-CM

## 2025-05-27 PROCEDURE — 99204 OFFICE O/P NEW MOD 45 MIN: CPT | Performed by: TRANSPLANT SURGERY

## 2025-05-27 PROCEDURE — 10004095 HB COUNTER, VISIT, NON TRANSPLANT

## 2025-05-27 PROCEDURE — 99211 OFF/OP EST MAY X REQ PHY/QHP: CPT

## 2025-06-06 ENCOUNTER — APPOINTMENT (OUTPATIENT)
Dept: DERMATOLOGY | Age: 30
End: 2025-06-06

## 2025-06-06 DIAGNOSIS — L30.9 DERMATITIS: Primary | ICD-10-CM

## 2025-06-06 DIAGNOSIS — L57.8 DIFFUSE PHOTODAMAGE OF SKIN: ICD-10-CM

## 2025-06-06 PROCEDURE — 99204 OFFICE O/P NEW MOD 45 MIN: CPT | Performed by: STUDENT IN AN ORGANIZED HEALTH CARE EDUCATION/TRAINING PROGRAM

## 2025-06-06 RX ORDER — HYDROCORTISONE 25 MG/G
CREAM TOPICAL
Qty: 30 G | Refills: 3 | Status: SHIPPED | OUTPATIENT
Start: 2025-06-06

## 2025-06-18 ENCOUNTER — CLINICAL DOCUMENTATION (OUTPATIENT)
Dept: TRANSPLANT | Age: 30
End: 2025-06-18

## 2025-06-19 ENCOUNTER — TELEPHONE (OUTPATIENT)
Dept: TRANSPLANT | Age: 30
End: 2025-06-19

## 2025-06-19 DIAGNOSIS — K76.9 LIVER LESION: Primary | ICD-10-CM

## 2025-07-01 ENCOUNTER — TELEPHONE (OUTPATIENT)
Dept: TRANSPLANT | Age: 30
End: 2025-07-01

## 2025-07-01 ENCOUNTER — TELEPHONE (OUTPATIENT)
Dept: DERMATOLOGY | Age: 30
End: 2025-07-01

## 2025-07-02 ENCOUNTER — TELEPHONE (OUTPATIENT)
Dept: FAMILY MEDICINE | Age: 30
End: 2025-07-02

## 2025-07-02 ENCOUNTER — E-ADVICE (OUTPATIENT)
Dept: DERMATOLOGY | Age: 30
End: 2025-07-02

## 2025-07-02 ENCOUNTER — WALK IN (OUTPATIENT)
Dept: URGENT CARE | Age: 30
End: 2025-07-02

## 2025-07-02 VITALS
WEIGHT: 173 LBS | SYSTOLIC BLOOD PRESSURE: 142 MMHG | TEMPERATURE: 98.4 F | DIASTOLIC BLOOD PRESSURE: 77 MMHG | BODY MASS INDEX: 24.82 KG/M2 | OXYGEN SATURATION: 99 % | HEART RATE: 77 BPM | RESPIRATION RATE: 18 BRPM

## 2025-07-02 DIAGNOSIS — B35.6 DERMATOPHYTOSIS OF GROIN AND PERIANAL AREA: Primary | ICD-10-CM

## 2025-07-02 PROCEDURE — 99213 OFFICE O/P EST LOW 20 MIN: CPT | Performed by: FAMILY MEDICINE

## 2025-07-02 RX ORDER — NAFTIFINE HYDROCHLORIDE 20 MG/G
CREAM TOPICAL DAILY
Qty: 60 G | Refills: 0 | Status: SHIPPED | OUTPATIENT
Start: 2025-07-02

## 2025-07-11 ENCOUNTER — APPOINTMENT (OUTPATIENT)
Dept: DERMATOLOGY | Age: 30
End: 2025-07-11

## 2025-07-11 DIAGNOSIS — D48.5 NEOPLASM OF UNCERTAIN BEHAVIOR OF SKIN: Primary | ICD-10-CM

## 2025-07-11 PROCEDURE — 88304 TISSUE EXAM BY PATHOLOGIST: CPT | Performed by: CLINICAL MEDICAL LABORATORY

## 2025-07-17 LAB
ASR DISCLAIMER: NORMAL
CASE RPRT: NORMAL
CLINICAL INFO: NORMAL
PATH REPORT.FINAL DX SPEC: NORMAL
PATH REPORT.GROSS SPEC: NORMAL
PATH REPORT.MICROSCOPIC SPEC OTHER STN: NORMAL

## 2025-07-24 ENCOUNTER — E-ADVICE (OUTPATIENT)
Dept: DERMATOLOGY | Age: 30
End: 2025-07-24